# Patient Record
Sex: FEMALE | Race: WHITE | NOT HISPANIC OR LATINO | URBAN - METROPOLITAN AREA
[De-identification: names, ages, dates, MRNs, and addresses within clinical notes are randomized per-mention and may not be internally consistent; named-entity substitution may affect disease eponyms.]

---

## 2021-03-01 ENCOUNTER — EMERGENCY (EMERGENCY)
Facility: HOSPITAL | Age: 61
LOS: 0 days | Discharge: HOME | End: 2021-03-01
Attending: EMERGENCY MEDICINE | Admitting: EMERGENCY MEDICINE
Payer: COMMERCIAL

## 2021-03-01 VITALS
HEART RATE: 67 BPM | RESPIRATION RATE: 18 BRPM | SYSTOLIC BLOOD PRESSURE: 160 MMHG | OXYGEN SATURATION: 98 % | TEMPERATURE: 98 F | DIASTOLIC BLOOD PRESSURE: 85 MMHG

## 2021-03-01 VITALS
HEIGHT: 66 IN | RESPIRATION RATE: 17 BRPM | TEMPERATURE: 98 F | DIASTOLIC BLOOD PRESSURE: 91 MMHG | HEART RATE: 80 BPM | WEIGHT: 199.96 LBS | OXYGEN SATURATION: 97 % | SYSTOLIC BLOOD PRESSURE: 181 MMHG

## 2021-03-01 DIAGNOSIS — R10.9 UNSPECIFIED ABDOMINAL PAIN: ICD-10-CM

## 2021-03-01 LAB
ALBUMIN SERPL ELPH-MCNC: 4.4 G/DL — SIGNIFICANT CHANGE UP (ref 3.5–5.2)
ALP SERPL-CCNC: 84 U/L — SIGNIFICANT CHANGE UP (ref 30–115)
ALT FLD-CCNC: 30 U/L — SIGNIFICANT CHANGE UP (ref 0–41)
ANION GAP SERPL CALC-SCNC: 13 MMOL/L — SIGNIFICANT CHANGE UP (ref 7–14)
APPEARANCE UR: CLEAR — SIGNIFICANT CHANGE UP
AST SERPL-CCNC: 17 U/L — SIGNIFICANT CHANGE UP (ref 0–41)
BASOPHILS # BLD AUTO: 0.05 K/UL — SIGNIFICANT CHANGE UP (ref 0–0.2)
BASOPHILS NFR BLD AUTO: 0.5 % — SIGNIFICANT CHANGE UP (ref 0–1)
BILIRUB SERPL-MCNC: <0.2 MG/DL — SIGNIFICANT CHANGE UP (ref 0.2–1.2)
BILIRUB UR-MCNC: NEGATIVE — SIGNIFICANT CHANGE UP
BUN SERPL-MCNC: 14 MG/DL — SIGNIFICANT CHANGE UP (ref 10–20)
CALCIUM SERPL-MCNC: 9.7 MG/DL — SIGNIFICANT CHANGE UP (ref 8.5–10.1)
CHLORIDE SERPL-SCNC: 102 MMOL/L — SIGNIFICANT CHANGE UP (ref 98–110)
CO2 SERPL-SCNC: 23 MMOL/L — SIGNIFICANT CHANGE UP (ref 17–32)
COLOR SPEC: YELLOW — SIGNIFICANT CHANGE UP
CREAT SERPL-MCNC: 0.8 MG/DL — SIGNIFICANT CHANGE UP (ref 0.7–1.5)
DIFF PNL FLD: NEGATIVE — SIGNIFICANT CHANGE UP
EOSINOPHIL # BLD AUTO: 0.18 K/UL — SIGNIFICANT CHANGE UP (ref 0–0.7)
EOSINOPHIL NFR BLD AUTO: 2 % — SIGNIFICANT CHANGE UP (ref 0–8)
GLUCOSE SERPL-MCNC: 120 MG/DL — HIGH (ref 70–99)
GLUCOSE UR QL: NEGATIVE MG/DL — SIGNIFICANT CHANGE UP
HCT VFR BLD CALC: 40.8 % — SIGNIFICANT CHANGE UP (ref 37–47)
HGB BLD-MCNC: 13.4 G/DL — SIGNIFICANT CHANGE UP (ref 12–16)
IMM GRANULOCYTES NFR BLD AUTO: 0.4 % — HIGH (ref 0.1–0.3)
KETONES UR-MCNC: NEGATIVE — SIGNIFICANT CHANGE UP
LEUKOCYTE ESTERASE UR-ACNC: NEGATIVE — SIGNIFICANT CHANGE UP
LYMPHOCYTES # BLD AUTO: 2.46 K/UL — SIGNIFICANT CHANGE UP (ref 1.2–3.4)
LYMPHOCYTES # BLD AUTO: 27 % — SIGNIFICANT CHANGE UP (ref 20.5–51.1)
MCHC RBC-ENTMCNC: 28.9 PG — SIGNIFICANT CHANGE UP (ref 27–31)
MCHC RBC-ENTMCNC: 32.8 G/DL — SIGNIFICANT CHANGE UP (ref 32–37)
MCV RBC AUTO: 88.1 FL — SIGNIFICANT CHANGE UP (ref 81–99)
MONOCYTES # BLD AUTO: 0.48 K/UL — SIGNIFICANT CHANGE UP (ref 0.1–0.6)
MONOCYTES NFR BLD AUTO: 5.3 % — SIGNIFICANT CHANGE UP (ref 1.7–9.3)
NEUTROPHILS # BLD AUTO: 5.9 K/UL — SIGNIFICANT CHANGE UP (ref 1.4–6.5)
NEUTROPHILS NFR BLD AUTO: 64.8 % — SIGNIFICANT CHANGE UP (ref 42.2–75.2)
NITRITE UR-MCNC: NEGATIVE — SIGNIFICANT CHANGE UP
NRBC # BLD: 0 /100 WBCS — SIGNIFICANT CHANGE UP (ref 0–0)
PH UR: 6.5 — SIGNIFICANT CHANGE UP (ref 5–8)
PLATELET # BLD AUTO: 323 K/UL — SIGNIFICANT CHANGE UP (ref 130–400)
POTASSIUM SERPL-MCNC: 4.4 MMOL/L — SIGNIFICANT CHANGE UP (ref 3.5–5)
POTASSIUM SERPL-SCNC: 4.4 MMOL/L — SIGNIFICANT CHANGE UP (ref 3.5–5)
PROT SERPL-MCNC: 7.7 G/DL — SIGNIFICANT CHANGE UP (ref 6–8)
PROT UR-MCNC: NEGATIVE MG/DL — SIGNIFICANT CHANGE UP
RBC # BLD: 4.63 M/UL — SIGNIFICANT CHANGE UP (ref 4.2–5.4)
RBC # FLD: 12.7 % — SIGNIFICANT CHANGE UP (ref 11.5–14.5)
SODIUM SERPL-SCNC: 138 MMOL/L — SIGNIFICANT CHANGE UP (ref 135–146)
SP GR SPEC: 1.01 — SIGNIFICANT CHANGE UP (ref 1.01–1.03)
UROBILINOGEN FLD QL: 0.2 MG/DL — SIGNIFICANT CHANGE UP (ref 0.2–0.2)
WBC # BLD: 9.11 K/UL — SIGNIFICANT CHANGE UP (ref 4.8–10.8)
WBC # FLD AUTO: 9.11 K/UL — SIGNIFICANT CHANGE UP (ref 4.8–10.8)

## 2021-03-01 PROCEDURE — 99285 EMERGENCY DEPT VISIT HI MDM: CPT

## 2021-03-01 PROCEDURE — 74177 CT ABD & PELVIS W/CONTRAST: CPT | Mod: 26

## 2021-03-01 RX ORDER — IBUPROFEN 200 MG
600 TABLET ORAL ONCE
Refills: 0 | Status: COMPLETED | OUTPATIENT
Start: 2021-03-01 | End: 2021-03-01

## 2021-03-01 RX ORDER — MORPHINE SULFATE 50 MG/1
4 CAPSULE, EXTENDED RELEASE ORAL ONCE
Refills: 0 | Status: DISCONTINUED | OUTPATIENT
Start: 2021-03-01 | End: 2021-03-01

## 2021-03-01 RX ADMIN — Medication 600 MILLIGRAM(S): at 21:29

## 2021-03-01 RX ADMIN — MORPHINE SULFATE 4 MILLIGRAM(S): 50 CAPSULE, EXTENDED RELEASE ORAL at 16:16

## 2021-03-01 NOTE — ED PROVIDER NOTE - PHYSICAL EXAMINATION
--EXAM--  VITAL SIGNS: I have reviewed vs documented at present.  CONSTITUTIONAL: Well-developed; well-nourished; in no acute distress.   SKIN: Warm and dry, no acute rash.   HEAD: Normocephalic; atraumatic.  EYES: PERRL, EOM intact; conjunctiva and sclera clear. No nystagmus.  ENT: No nasal discharge; airway clear.  NECK: Supple; non tender.  CARD: S1, S2, Regular rate and rhythm.   RESP: No wheezes, rales or rhonchi.  ABD: Normal bowel sounds; soft; non-distended; non-tender. there is cva tenderness bilateral  EXT: Normal ROM.   NEURO: Alert, oriented, grossly unremarkable. Strength 5/5 in all extremities. Sensation intact throughout.  PSYCH: Cooperative, appropriate.

## 2021-03-01 NOTE — ED PROVIDER NOTE - PROVIDER TOKENS
PROVIDER:[TOKEN:[[id number]]],FREE:[LAST:[your],FIRST:[pmd],PHONE:[(   )    -],FAX:[(   )    -]]

## 2021-03-01 NOTE — ED PROVIDER NOTE - PATIENT PORTAL LINK FT
You can access the FollowMyHealth Patient Portal offered by [facility] by registering at the following website: [email or web address]. By joining [employer]’s FollowMyHealth portal, you will also be able to view your health information using other applications (apps) compatible with our system.

## 2021-03-01 NOTE — ED PROVIDER NOTE - CLINICAL SUMMARY MEDICAL DECISION MAKING FREE TEXT BOX
60yF pw  bl flank pain x 2 days labs reviewed wnl no uti  CT ap Nonspecific inflammatory changes surrounding origin of the SMA may reflect nonspecific underlying inflammatory/immune reaction/vasculitis - Dw and printed for patient   will follow up outpt with her pmd and  vascular   Patient to be discharged from ED. Any available test results were discussed with and printed  for patient.  Verbal instructions given, including instructions to return to ED immediately for any new, worsening, or concerning symptoms. Limitations of ED work up discussed.  Patient reports understanding of above with capacity and insight. Written discharge instructions additionally given, including follow-up plan.

## 2021-03-01 NOTE — ED ADULT NURSE NOTE - CHIEF COMPLAINT QUOTE
Start azithromycin  Start Prednisone  • Use humidifier or steam inhalations  • May use Tylenol or Ibuprofen for pain comfort  • May use OTC cough suppressant syrup  • May use throat lozenges  • Increase oral fluid intake  • Avoid caffeine, alcohol, smoking  • Frequent hand washing  •   
" I have B/L kidney pain. "

## 2021-03-01 NOTE — ED PROVIDER NOTE - ATTENDING CONTRIBUTION TO CARE
60y female with "bilateral kidney pain,' intermittent, with no assoc symptoms, on exam vital signs appreciated, well appearing abd +bs, snt/nd, +right paravertebral lumbar ttp, will cehck labs, urine, imaging

## 2021-03-01 NOTE — ED PROVIDER NOTE - OBJECTIVE STATEMENT
this is a 61 yo female presents to ed for evaluation of bilateral flank pain. patient states that pain is going on for about two days. patient denies urinary symptoms.

## 2021-03-01 NOTE — ED PROVIDER NOTE - NS ED ROS FT
Review of Systems:  	•	CONSTITUTIONAL - no fever, no diaphoresis, no chills  	•	SKIN - no rash  	•	HEMATOLOGIC - no bleeding, no bruising  	•	EYES - no eye pain, no blurry vision  	•	ENT - no change in hearing, no sore throat, no ear pain or tinnitus  	•	RESPIRATORY - no shortness of breath, no cough  	•	CARDIAC - no chest pain, no palpitations  	•	GI - bilateral flank pain , no nausea, no vomiting, no diarrhea, no constipation  	•	GENITO-URINARY - no discharge, no dysuria; no hematuria, no increased urinary frequency  	•	MUSCULOSKELETAL - no joint paint, no swelling, no redness  	•	NEUROLOGIC - no weakness, no headache, no paresthesias, no LOC  	•	PSYCH - no anxiety, non suicidal, non homicidal, no hallucination, no depression

## 2021-03-01 NOTE — ED PROVIDER NOTE - CARE PROVIDER_API CALL
Nabil Morin  VASCULAR SURGERY  [address] [place] [address]  Phone: [phone]  Fax: [phone]  Follow Up Time:     your, pmd  Phone: (   )    -  Fax: (   )    -  Follow Up Time:

## 2021-03-02 LAB
CULTURE RESULTS: SIGNIFICANT CHANGE UP
SPECIMEN SOURCE: SIGNIFICANT CHANGE UP

## 2022-08-05 ENCOUNTER — APPOINTMENT (OUTPATIENT)
Dept: ORTHOPEDIC SURGERY | Facility: CLINIC | Age: 62
End: 2022-08-05

## 2022-08-05 VITALS — HEIGHT: 66 IN | WEIGHT: 200 LBS | BODY MASS INDEX: 32.14 KG/M2

## 2022-08-05 DIAGNOSIS — M17.11 UNILATERAL PRIMARY OSTEOARTHRITIS, RIGHT KNEE: ICD-10-CM

## 2022-08-05 DIAGNOSIS — M25.561 PAIN IN RIGHT KNEE: ICD-10-CM

## 2022-08-05 PROBLEM — Z00.00 ENCOUNTER FOR PREVENTIVE HEALTH EXAMINATION: Status: ACTIVE | Noted: 2022-08-05

## 2022-08-05 PROCEDURE — 99213 OFFICE O/P EST LOW 20 MIN: CPT | Mod: 25

## 2022-08-05 PROCEDURE — 20610 DRAIN/INJ JOINT/BURSA W/O US: CPT | Mod: RT

## 2022-08-05 PROCEDURE — 73562 X-RAY EXAM OF KNEE 3: CPT | Mod: RT

## 2022-08-05 RX ORDER — DICLOFENAC SODIUM 50 MG/1
50 TABLET, DELAYED RELEASE ORAL
Qty: 60 | Refills: 1 | Status: ACTIVE | COMMUNITY
Start: 2022-08-05 | End: 1900-01-01

## 2022-08-05 NOTE — PROCEDURE
[Large Joint Injection] : Large joint injection [Right] : of the right [Knee] : knee [Pain] : pain [Alcohol] : alcohol [____] : [unfilled] [] : Patient tolerated procedure well [Call if redness, pain or fever occur] : call if redness, pain or fever occur [Apply ice for 15min out of every hour for the next 12-24 hours as tolerated] : apply ice for 15 minutes out of every hour for the next 12-24 hours as tolerated

## 2022-08-05 NOTE — HISTORY OF PRESENT ILLNESS
[de-identified] :  Patient is a 62-year-old female who reports office for evaluation of her right knee pain that has been aggravating her for the past week and a half.  Denies any trauma or injury to the area.  Admits that her knee has been buckling/giving out on her.  Walking, up and down stairs, getting up from seated position of flexing and extending the all aggravate the patient's pain at times.  Denies any numbness or tingling.

## 2022-08-05 NOTE — DISCUSSION/SUMMARY
[de-identified] : Diclofenac 5mg PO BID PRN was sent to the patient's pharmacy to help alleviate their symptoms.  Patient was advised to monitor her blood pressure while taking this medication.\par \par The patient was advised to rest/ice the area and can alternate with warm compresses as needed.  A script for physical therapy was printed for the patient so they can get started on that.\par \par With the patient's approval, and under sterile technique, I performed a steroid injection today.  See the attached procedure note for further details.  The patient was informed that their next cortisone injection could not be until a minimum of three months from today's date and the patient understands.  Explained to the patient that the full effect of the injection will take 3-5 days to kick in.\par \par Patient may benefit from viscosupplementation injections the patient her but she would like to hold off on that for now.  Patient will follow-up in 3-4 months for further evaluation.  All of the patient's questions/concerns were answered in detail.  \par \par The patient was seen under the supervision of Dr. López.\par

## 2022-08-05 NOTE — IMAGING
[de-identified] :   Examination of the right knee is as follows:  No erythema or ecchymosis.  Mild effusion noted.  Able to perform active straight leg raise.  Knee flexion from 0-110 degrees with mild stiffness and pain.  Patellofemoral, medial joint line tenderness to palpation.  Positive Emily's.  Mildly antalgic gait.\par \par X-rays taken of the patient's right knee in the office today revealed no obvious fractures, subluxations, or dislocations.  Joint space narrowing along with patellofemoral/osteoarthritic changes noted.

## 2022-10-17 ENCOUNTER — NON-APPOINTMENT (OUTPATIENT)
Age: 62
End: 2022-10-17

## 2023-02-09 ENCOUNTER — APPOINTMENT (OUTPATIENT)
Dept: ORTHOPEDIC SURGERY | Facility: CLINIC | Age: 63
End: 2023-02-09
Payer: COMMERCIAL

## 2023-02-09 PROCEDURE — 20610 DRAIN/INJ JOINT/BURSA W/O US: CPT | Mod: 50

## 2023-02-09 PROCEDURE — 73562 X-RAY EXAM OF KNEE 3: CPT | Mod: LT

## 2023-02-09 PROCEDURE — 99213 OFFICE O/P EST LOW 20 MIN: CPT | Mod: 25

## 2023-02-09 NOTE — IMAGING
[de-identified] :   Physical exam left knee:  Mild generalized swelling throughout.  No ecchymosis erythema appreciated.  Skin is intact.  Patient mildly tender at the medial and lateral joint lines.  Patient   Has good range of motion upon flexion  and extension of may however experiences mild pain when doing so.  Positive patellar grind.  No tenderness over the patellar quad tendons.  Patient ambulating well and can bear weight.\par \par   Physical exam of right knee: Mild generalized swelling throughout.  No ecchymosis erythema appreciated.  skin is intact.  Patient mildly tender to palpation to the medial joint line.  Patient has good range of motion on flexion extension however experiences mild pain when doing so.  Positive patellar grind.  No tennis of the quad patella tendons.  Patient ambulating well and can bear weight however experiences pain when doing so.

## 2023-02-09 NOTE — DISCUSSION/SUMMARY
[de-identified] :   With the patient's approval, and under sterile technique, I performed a steroid injection today.  See the attached procedure note for further details.  The patient was informed that their next cortisone injection could not be until a minimum of three months from today's date and the patient understands.  Explained to the patient that the full effect of the injection will take 3-5 days to kick in. \par \par   Patient taking Advil/ibuprofen over-the-counter as needed for pain.\par \par   Patient will follow-up in Four-monthd for further evaluation.  All of the patient's questions/concerns were answered in detail.  \par \par Patient was seen under the supervision of Dr. López.\par

## 2023-02-09 NOTE — DATA REVIEWED
[FreeTextEntry1] :   X-rays of the left knee taken in the office today:  Mild osteoarthritic changes. mild patellofemoral arthritis.  No acute fractures, subluxations or dislocations.\par \par  x-rays of the right knee taken in the office 6 months ago were reviewed: Mild osteoarthritic changes.  Mild patellofemoral arthritis.  No acute fractures, subluxations, or dislocations.

## 2023-02-09 NOTE — PROCEDURE
[Large Joint Injection] : Large joint injection [Bilateral] : bilaterally of the [Knee] : knee [Pain] : pain [Alcohol] : alcohol [Sterile technique used] : sterile technique used [____] : [unfilled] [] : Patient tolerated procedure well [Call if redness, pain or fever occur] : call if redness, pain or fever occur [Apply ice for 15min out of every hour for the next 12-24 hours as tolerated] : apply ice for 15 minutes out of every hour for the next 12-24 hours as tolerated [Previous OTC use and PT nontherapeutic] : patient has tried OTC's including aspirin, Ibuprofen, Aleve, etc or prescription NSAIDS, and/or exercises at home and/or physical therapy without satisfactory response [Patient had decreased mobility in the joint] : patient had decreased mobility in the joint

## 2023-02-09 NOTE — HISTORY OF PRESENT ILLNESS
[de-identified] :  patient is a 62-year-old female here for evaluation of  bilateral knee pain left greater than right.  Patient reports an achy pain in her knees specifically when walking for long periods of time and going up and down stairs.  She has a history of right knee arthritis and says the current pain in her left knee feels very similar.  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  She is ambulating well however experiences mild pain intermittently when bearing weight.  She received cortisone injection in her right knee 6 months ago with relief and wishes to get another injection in her right knee and an injection in her left knee as well as possible.

## 2023-03-01 ENCOUNTER — APPOINTMENT (OUTPATIENT)
Dept: ORTHOPEDIC SURGERY | Facility: CLINIC | Age: 63
End: 2023-03-01
Payer: COMMERCIAL

## 2023-03-01 PROCEDURE — 73110 X-RAY EXAM OF WRIST: CPT | Mod: 50

## 2023-03-01 PROCEDURE — 20604 DRAIN/INJ JOINT/BURSA W/US: CPT | Mod: RT

## 2023-03-01 PROCEDURE — 99214 OFFICE O/P EST MOD 30 MIN: CPT | Mod: 25

## 2023-03-01 NOTE — ASSESSMENT
[FreeTextEntry1] : Patient comes in with pain in both of her hands. She states its been going on for about a year now. She does have history of arthritis.  She works as a NICU nurse.  She says opening jars hurts her.\par \par BL hand: \par +thumb CMC swelling \par +thumb CMC tenderness \par Decreased thumb ROM \par +Basal grind test\par \par X-rays show basal joint arthritis\par \par The patient was advised of the diagnosis. The natural history of the pathology was explained in full to the patient in layman's terms. We reviewed that the forces applied to the thumb tip are magnified 12-14 times at the thumb cmc joint.  We also reviewed the progression of arthritis with early arthritis showing minimal to no xray changes.  We discussed treatment options, including activity modification(demonstrated), medicine(topical and oral), bracing, therapy, injection and eventually surgery.  We reviewed the r/b of each.  All questions were answered and the patient verbalized understanding. patient was given an AO a thumb brace for the left side.  For the right side once we get it and we will give her a call and give it to her as well.\par \par After a discussion of the risks, benefits and alternatives along with the expectations, the patient was amenable to injection.  The indication for injection is pain, inflammation and radiographically confirmed arthritis.\par Anesthesia: ethyl chloride sprayed topically\par Dexamethasone: An injection of 0.9cc\par Lidocaine: An injection of Lidocaine 1% 0.1 cc\par \par Patient has tried OTC's including aspirin, Ibuprofen, Aleve etc or prescription NSAIDS, and/or exercises at home and/ or\par physical therapy without satisfactory response.\par After verbal consent using sterile preparation and technique. The risks, benefits, and alternatives to cortisone injection\par were explained in full to the patient. Risks outlined include but are not limited to infection, sepsis, bleeding, scarring, skin\par discoloration, temporary increase in pain, syncopal episode, failure to resolve symptoms, allergic reaction, symptom\par recurrence, and elevation of blood sugar in diabetics. Patient understood the risks. All questions were answered. After\par discussion of options, patient requested an injection. Oral informed consent was obtained and sterile prep was done of the\par injection site. Sterile technique was utilized for the procedure including the preparation of the solutions used for the\par injection. Prep with betadine  and alcohol locally to site.  Using ultrasound guidance, the joint was visualized.  The area was then sprayed with ethyl chloride and an injection was given.  Patient tolerated the procedure well. Advised to ice the injection site this evening.\par \par Patient opted for injection for the right side as she says she has difficulty using the braces regularly.  In addition we do not have an airway brace for the right side.\par \par \par \par \par \par Patient will try wearing the CMC thumb guard brace.

## 2023-05-26 ENCOUNTER — APPOINTMENT (OUTPATIENT)
Dept: ORTHOPEDIC SURGERY | Facility: CLINIC | Age: 63
End: 2023-05-26

## 2023-06-23 ENCOUNTER — APPOINTMENT (OUTPATIENT)
Dept: ORTHOPEDIC SURGERY | Facility: CLINIC | Age: 63
End: 2023-06-23
Payer: COMMERCIAL

## 2023-06-23 PROCEDURE — 20610 DRAIN/INJ JOINT/BURSA W/O US: CPT | Mod: 50

## 2023-06-23 PROCEDURE — 99213 OFFICE O/P EST LOW 20 MIN: CPT | Mod: 25

## 2023-06-23 NOTE — IMAGING
[de-identified] : Bilateral knee exam is as follows: Minimal effusion noted.  No erythema or ecchymosis.  Able to perform active straight leg raise.  Knee flexion from 0 to 20 degrees with mild pain.  Patellofemoral, medial/lateral joint line tenderness to palpation.  Calf soft nontender.  Equivocal Emily's.  Light touch intact throughout.  Mildly antalgic gait.

## 2023-06-23 NOTE — PROCEDURE
[Large Joint Injection] : Large joint injection [Bilateral] : bilaterally of the [Knee] : knee [Pain] : pain [Alcohol] : alcohol [Ethyl Chloride sprayed topically] : ethyl chloride sprayed topically [Sterile technique used] : sterile technique used [____] : [unfilled] [] : Patient tolerated procedure well [Call if redness, pain or fever occur] : call if redness, pain or fever occur [Apply ice for 15min out of every hour for the next 12-24 hours as tolerated] : apply ice for 15 minutes out of every hour for the next 12-24 hours as tolerated [Risks, benefits, alternatives discussed / Verbal consent obtained] : the risks benefits, and alternatives have been discussed, and verbal consent was obtained

## 2023-06-23 NOTE — HISTORY OF PRESENT ILLNESS
[de-identified] : Patient is a 63-year-old female reports to the office for subsequent reevaluation of her bilateral knee pain/osteoarthritis.  She had cortisone injections done earlier this year which gave her some relief but has been starting to wear off.  Walking, up and down stairs, getting from seated position, running, prolonged walking aggravate the patient's pain.  Denies any numbness or tingling.

## 2023-06-23 NOTE — DISCUSSION/SUMMARY
[de-identified] : Patient will take OTC NSAIDs as needed for pain.  The patient was advised to rest/ice the area and may alternate with warm compresses as needed.  The knee conditioning program from the [facility] was given to the patient so they may try that at home.\par \par With the patient's approval, and under sterile technique, I performed a steroid injection today.  See the attached procedure note for further details.  The patient was informed that their next cortisone injection could not be until a minimum of three months from today's date and the patient understands.  Explained to the patient that the full effect of the injection will take 3-5 days to kick in. \par \par Bilateral knee Synvisc–one gel injections were ordered for the patient so she may receive that her next visit.  The patient will follow-up in 6 weeks for further evaluation.  All of the patient's questions/concerns were answered in detail.\par \par

## 2023-06-29 ENCOUNTER — FORM ENCOUNTER (OUTPATIENT)
Age: 63
End: 2023-06-29

## 2023-08-08 ENCOUNTER — APPOINTMENT (OUTPATIENT)
Dept: ORTHOPEDIC SURGERY | Facility: CLINIC | Age: 63
End: 2023-08-08
Payer: COMMERCIAL

## 2023-08-08 DIAGNOSIS — M18.0 BILATERAL PRIMARY OSTEOARTHRITIS OF FIRST CARPOMETACARPAL JOINTS: ICD-10-CM

## 2023-08-08 PROCEDURE — 99213 OFFICE O/P EST LOW 20 MIN: CPT | Mod: 25

## 2023-08-08 PROCEDURE — 20604 DRAIN/INJ JOINT/BURSA W/US: CPT | Mod: RT

## 2023-08-11 ENCOUNTER — APPOINTMENT (OUTPATIENT)
Dept: ORTHOPEDIC SURGERY | Facility: CLINIC | Age: 63
End: 2023-08-11

## 2023-08-15 PROBLEM — M18.0 ARTHRITIS OF CARPOMETACARPAL (CMC) JOINT OF BOTH THUMBS: Status: ACTIVE | Noted: 2023-03-01

## 2023-08-15 NOTE — ASSESSMENT
[FreeTextEntry1] : The patient comes in for a follow up. She states her left side is doing better. The right side causes her a lot of pain. She does not wear the braces.   BL hand:  +thumb CMC swelling  +thumb CMC tenderness  Decreased thumb ROM  +Basal grind test   The patient was advised of the diagnosis. The natural history of the pathology was explained in full to the patient in layman's terms. We reviewed that the forces applied to the thumb tip are magnified 12-14 times at the thumb cmc joint.  We also reviewed the progression of arthritis with early arthritis showing minimal to no xray changes.  We discussed treatment options, including activity modification(demonstrated), medicine(topical and oral), bracing, therapy, injection and eventually surgery.  We reviewed the r/b of each.  All questions were answered and the patient verbalized understanding.   After a discussion of the risks, benefits and alternatives along with the expectations, the patient was amenable to injection.  The indication for injection is pain, inflammation and radiographically confirmed arthritis. Anesthesia: ethyl chloride sprayed topically Dexamethasone: An injection of 0.9cc Lidocaine: An injection of Lidocaine 1% 0.1 cc  Patient has tried OTC's including aspirin, Ibuprofen, Aleve etc or prescription NSAIDS, and/or exercises at home and/ or physical therapy without satisfactory response. After verbal consent using sterile preparation and technique. The risks, benefits, and alternatives to cortisone injection were explained in full to the patient. Risks outlined include but are not limited to infection, sepsis, bleeding, scarring, skin discoloration, temporary increase in pain, syncopal episode, failure to resolve symptoms, allergic reaction, symptom recurrence, and elevation of blood sugar in diabetics. Patient understood the risks. All questions were answered. After discussion of options, patient requested an injection. Oral informed consent was obtained and sterile prep was done of the injection site. Sterile technique was utilized for the procedure including the preparation of the solutions used for the injection. Prep with betadine  and alcohol locally to site.  Using ultrasound guidance, the joint was visualized.  The area was then sprayed with ethyl chloride and an injection was given.  Patient tolerated the procedure well. Advised to ice the injection site this evening.  Patient opted for injection for the right basal joint. She tolerated the injection well. This is her second injection. She will follow up in 3 months if needed.

## 2023-10-20 ENCOUNTER — APPOINTMENT (OUTPATIENT)
Dept: ORTHOPEDIC SURGERY | Facility: CLINIC | Age: 63
End: 2023-10-20
Payer: COMMERCIAL

## 2023-10-20 PROCEDURE — 99213 OFFICE O/P EST LOW 20 MIN: CPT | Mod: 25

## 2023-10-20 PROCEDURE — 20610 DRAIN/INJ JOINT/BURSA W/O US: CPT | Mod: 50

## 2023-12-01 ENCOUNTER — APPOINTMENT (OUTPATIENT)
Dept: ORTHOPEDIC SURGERY | Facility: CLINIC | Age: 63
End: 2023-12-01
Payer: COMMERCIAL

## 2023-12-01 DIAGNOSIS — M17.0 BILATERAL PRIMARY OSTEOARTHRITIS OF KNEE: ICD-10-CM

## 2023-12-01 PROCEDURE — 20611 DRAIN/INJ JOINT/BURSA W/US: CPT | Mod: 50

## 2023-12-01 PROCEDURE — 99213 OFFICE O/P EST LOW 20 MIN: CPT | Mod: 25

## 2023-12-08 ENCOUNTER — APPOINTMENT (OUTPATIENT)
Dept: ORTHOPEDIC SURGERY | Facility: CLINIC | Age: 63
End: 2023-12-08

## 2024-06-06 ENCOUNTER — APPOINTMENT (OUTPATIENT)
Dept: ORTHOPEDIC SURGERY | Facility: CLINIC | Age: 64
End: 2024-06-06

## 2025-04-29 ENCOUNTER — APPOINTMENT (OUTPATIENT)
Dept: ORTHOPEDIC SURGERY | Facility: CLINIC | Age: 65
End: 2025-04-29
Payer: COMMERCIAL

## 2025-04-29 DIAGNOSIS — M17.0 BILATERAL PRIMARY OSTEOARTHRITIS OF KNEE: ICD-10-CM

## 2025-04-29 PROCEDURE — 73562 X-RAY EXAM OF KNEE 3: CPT | Mod: 50

## 2025-04-29 PROCEDURE — 99213 OFFICE O/P EST LOW 20 MIN: CPT

## 2025-04-29 RX ORDER — MELOXICAM 15 MG/1
15 TABLET ORAL
Qty: 30 | Refills: 1 | Status: ACTIVE | COMMUNITY
Start: 2025-04-29 | End: 1900-01-01

## 2025-06-12 ENCOUNTER — APPOINTMENT (OUTPATIENT)
Dept: ORTHOPEDIC SURGERY | Facility: CLINIC | Age: 65
End: 2025-06-12
Payer: COMMERCIAL

## 2025-06-12 PROCEDURE — 20610 DRAIN/INJ JOINT/BURSA W/O US: CPT | Mod: 50

## 2025-06-12 PROCEDURE — 99213 OFFICE O/P EST LOW 20 MIN: CPT | Mod: 25
